# Patient Record
Sex: FEMALE | Race: WHITE | URBAN - METROPOLITAN AREA
[De-identification: names, ages, dates, MRNs, and addresses within clinical notes are randomized per-mention and may not be internally consistent; named-entity substitution may affect disease eponyms.]

---

## 2023-06-21 ENCOUNTER — TELEPHONE (OUTPATIENT)
Dept: NEUROLOGY | Facility: CLINIC | Age: 53
End: 2023-06-21

## 2023-06-27 ENCOUNTER — TELEPHONE (OUTPATIENT)
Dept: NEUROLOGY | Facility: CLINIC | Age: 53
End: 2023-06-27
Payer: COMMERCIAL

## 2023-06-27 NOTE — TELEPHONE ENCOUNTER
Spoke with pt who self-scheduled with Dr Juárez. Pt is seeking Neuro w/u as recommended by her Rheumatologist, Dr Live, @[facility], whom she sees for SLE, RA, Sjrogen's, for which she is treated, since 2014, with Benlysta infusions. 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 Pt had episode in February, 2023, of blacking out with no no known cause. Did not hit head. She once saw Neuro, Dr Chau Jules, at [facility] in 2015 (now retired) Insurance limits her to certain providers and [facility] [facility] is included. When pt is overtired, she occasionally has tremors. She experiences imbalance as well. She does have brain fog/ memory concerns, can no longer multi-task.  Offered next available appointment this Thursday in resident's clinic. Pt accepted next available, verbalized understanding, and repeated back date/time/location of scheduled appointment.

## 2023-06-29 ENCOUNTER — OFFICE VISIT (OUTPATIENT)
Dept: NEUROLOGY | Facility: CLINIC | Age: 53
End: 2023-06-29
Payer: COMMERCIAL

## 2023-06-29 VITALS — HEART RATE: 78 BPM | SYSTOLIC BLOOD PRESSURE: 124 MMHG | DIASTOLIC BLOOD PRESSURE: 82 MMHG

## 2023-06-29 DIAGNOSIS — G63 POLYNEUROPATHY ASSOCIATED WITH UNDERLYING DISEASE: Primary | ICD-10-CM

## 2023-06-29 PROCEDURE — 99204 PR OFFICE/OUTPT VISIT, NEW, LEVL IV, 45-59 MIN: ICD-10-PCS | Mod: S$GLB,,, | Performed by: PSYCHIATRY & NEUROLOGY

## 2023-06-29 PROCEDURE — 99999 PR PBB SHADOW E&M-EST. PATIENT-LVL IV: ICD-10-PCS | Mod: PBBFAC,,, | Performed by: STUDENT IN AN ORGANIZED HEALTH CARE EDUCATION/TRAINING PROGRAM

## 2023-06-29 PROCEDURE — 99999 PR PBB SHADOW E&M-EST. PATIENT-LVL IV: CPT | Mod: PBBFAC,,, | Performed by: STUDENT IN AN ORGANIZED HEALTH CARE EDUCATION/TRAINING PROGRAM

## 2023-06-29 PROCEDURE — 99204 OFFICE O/P NEW MOD 45 MIN: CPT | Mod: S$GLB,,, | Performed by: PSYCHIATRY & NEUROLOGY

## 2023-06-29 RX ORDER — HYDROXYCHLOROQUINE SULFATE 200 MG/1
TABLET, FILM COATED ORAL
COMMUNITY

## 2023-06-29 RX ORDER — METOPROLOL TARTRATE 25 MG/1
TABLET, FILM COATED ORAL
COMMUNITY

## 2023-06-29 RX ORDER — ERGOCALCIFEROL 1.25 MG/1
CAPSULE ORAL
COMMUNITY

## 2023-06-29 RX ORDER — METHOTREXATE 2.5 MG/1
TABLET ORAL
COMMUNITY

## 2023-06-29 RX ORDER — ONDANSETRON HYDROCHLORIDE 8 MG/1
TABLET, FILM COATED ORAL
COMMUNITY

## 2023-06-29 RX ORDER — ISOSORBIDE MONONITRATE 60 MG/1
TABLET, EXTENDED RELEASE ORAL
COMMUNITY
Start: 2023-01-30

## 2023-06-29 RX ORDER — ALBUTEROL SULFATE 0.83 MG/ML
SOLUTION RESPIRATORY (INHALATION)
COMMUNITY

## 2023-06-29 RX ORDER — SPIRONOLACTONE 50 MG/1
TABLET, FILM COATED ORAL
COMMUNITY
Start: 2023-01-30

## 2023-06-29 RX ORDER — BELIMUMAB 120 MG/1.5ML
INJECTION, POWDER, LYOPHILIZED, FOR SOLUTION INTRAVENOUS
COMMUNITY

## 2023-06-29 RX ORDER — VALACYCLOVIR HYDROCHLORIDE 500 MG/1
TABLET, FILM COATED ORAL
COMMUNITY
Start: 2022-09-22

## 2023-06-29 RX ORDER — LEUCOVORIN CALCIUM 10 MG/1
TABLET ORAL
COMMUNITY

## 2023-06-29 RX ORDER — DULOXETIN HYDROCHLORIDE 60 MG/1
CAPSULE, DELAYED RELEASE ORAL
COMMUNITY
Start: 2022-09-21

## 2023-06-29 RX ORDER — LIFITEGRAST 50 MG/ML
SOLUTION/ DROPS OPHTHALMIC
COMMUNITY

## 2023-06-29 RX ORDER — ATORVASTATIN CALCIUM 20 MG/1
TABLET, FILM COATED ORAL
COMMUNITY
Start: 2023-01-30

## 2023-06-29 RX ORDER — ESOMEPRAZOLE MAGNESIUM 40 MG/1
CAPSULE, DELAYED RELEASE ORAL
COMMUNITY

## 2023-06-29 RX ORDER — FOLIC ACID 1 MG/1
TABLET ORAL
COMMUNITY

## 2023-06-29 NOTE — PROGRESS NOTES
[facility] - NEUROLOGY 7TH [address] [facility], [place]    Date: 6/29/23  Patient Name: Shannan Smith   MRN: [id number]   PCP: Marlene Munoz  Referring Provider: Lorena Batista MD    Assessment:   Shannan Smith is a 52 y.o. female presenting as initial evaluation for intermittent tremors, sensory changes and intermittent word finding difficulty. Patient refers that its been years since and she has been requiring a walker for long distances because of fatigue and fall risk. On exam  strength is 5/5, DTR 2+ but some diminished vibration and light touch on LUE/LLE. No resting tremor noted, no cogwheel rigidity or bradykinesia noted. Given some subjective weakness and soreness will do evaluation for neuropathy or muscle issue, particularly given SLE history.     During interview patient gets very emotional about family situations and her work. She also has some RF for PJ and suspect memory issues multifactorial at the time. No interfere with patient's ADLs    Plan:   -- Labs for peripheral neuropathy  -- EMG/NCS   -- Will f/u in 6 months      Problem List Items Addressed This Visit    None  Visit Diagnoses       Polyneuropathy associated with underlying disease    -  Primary    Relevant Orders    Copper, Serum    Ambulatory referral/consult to Physical/Occupational Therapy    Ceruloplasmin    CK    EMG W/ ULTRASOUND AND NERVE CONDUCTION TEST 2 Extremities    Folate    Heavy Metals Screen, Blood (Quantitative)    T4, Free    Thyroid Peroxidase Antibody    TSH    Vitamin B1    Vitamin B12 Deficiency Panel    Vitamin B6    Vitamin C            Dalia Blanco MD    Patient note was created using MModal Dictation.  Any errors in syntax or even information may not have been identified and edited on initial review prior to signing this note.  Subjective:   Patient seen in consultation at the request of Lorena Batista MD for the evaluation of tremor. A copy of this note will be sent to  the referring physician.        HPI:   Ms. Shannan Smith is a 52 y.o. female  w pmH sle (belimumab) 2015, sjrogen, RA, neurocardiogenic syncope, angina pectoris peripheral neuropathy, shingles on valtrex HLD presenting as initial evaluation for multiple complaints such as memory problems, intermittent tremors and falls. Refers she is having short term memory issues, difficulty with multi tasking and organizing. 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 She refers some snoring and was evaluated for PJ but could complete study but they thought she was high risk. Having trouble explaining things and can not put into words. Alos refers some intermittent tremors, if reaches for the mouse has some hand jerks and having some trouble with writing.    She refers that she can no longer do long distances as she feels her muscles ware out which is why she uses walker. She also refers constant burning pain in her feet and has been on duloxetine for years.         PAST MEDICAL HISTORY:  No past medical history on file.    PAST SURGICAL HISTORY:  No past surgical history on file.    CURRENT MEDS:  Current Outpatient Medications   Medication Sig Dispense Refill    albuterol (PROVENTIL) 2.5 mg /3 mL (0.083 %) nebulizer solution albuterol sulfate (2.5 mg/3ml) 0.083% nebu      atorvastatin (LIPITOR) 20 MG tablet atorvastatin 20 mg tablet   ONE BY MOUTH EVERY DAY      belimumab (BENLYSTA) 120 mg SolR Benlysta 120 mg intravenous solution   INFUSE 10 MG/KG OVER 60 MINUTE(S) BY INTRAVENOUS ROUTE EVERY 4 WEEKS      DULoxetine (CYMBALTA) 60 MG capsule duloxetine 60 mg capsule,delayed release   TAKE ONE CAPSULE BY MOUTH EVERY DAY      ergocalciferol (ERGOCALCIFEROL) 50,000 unit Cap ergocalciferol (vitamin D2) 1,250 mcg (50,000 unit) capsule   TAKE ONE CAPSULE BY MOUTH ONCE WEEKLY      esomeprazole (NEXIUM) 40 MG capsule esomeprazole magnesium 40 mg capsule,delayed release   TAKE ONE CAPSULE BY MOUTH  EVERY DAY FOR REFLUX      folic acid (FOLVITE) 1 MG tablet folic acid 1 mg tablet   TAKE ONE TABLET BY MOUTH EVERY DAY      hydrOXYchloroQUINE (PLAQUENIL) 200 mg tablet hydroxychloroquine 200 mg tablet   TAKE ONE TABLET BY MOUTH TWICE DAILY      isosorbide mononitrate (IMDUR) 60 MG 24 hr tablet isosorbide mononitrate ER 60 mg tablet,extended release 24 hr   TAKE ONE TABLET BY MOUTH EVERY MORNING      leucovorin (WELLCOVORIN) 10 MG tablet leucovorin calcium 10 mg tablet   TAKE 1 TABLET BY MOUTH 24 HOURS AFTER  METHOTREXATE DOSE      lifitegrast (XIIDRA) 5 % Dpet Xiidra 5 % eye drops in a dropperette   INSTILL ONE DROP IN EACH EYE TWICE DAILY      methotrexate 2.5 MG Tab methotrexate sodium 2.5 mg tablet   TAKE FIVE TABLETS IN THE MORNING AND FOUR TABLETS IN THE EVENING BY MOUTH ONE DAY PER WEEK      metoprolol tartrate (LOPRESSOR) 25 MG tablet metoprolol tartrate 25 mg tablet   TAKE ONE TABLET BY MOUTH TWICE DAILY WITH FOOD FOR BLOOD PRESSURE      ondansetron (ZOFRAN) 8 MG tablet ondansetron HCl 8 mg tablet   Take 1 tablet every 24 hours by oral route as needed.      spironolactone (ALDACTONE) 50 MG tablet spironolactone 50 mg tablet   take 1 tablet by mouth twice daily      valACYclovir (VALTREX) 500 MG tablet valacyclovir 500 mg tablet   TAKE ONE TABLET BY MOUTH EVERY DAY       No current facility-administered medications for this visit.       ALLERGIES:  Review of patient's allergies indicates:   Allergen Reactions    Lisinopril Other (See Comments)     Cough    Ketorolac Other (See Comments)     Sweating  Burning        FAMILY HISTORY:  No family history on file.    SOCIAL HISTORY:       Review of Systems:  12 system review of systems is negative except for the symptoms mentioned in HPI.      Objective:     Vitals:    06/29/23 1537   BP: 124/82   Pulse: 78     General: NAD, well nourished   Eyes: no tearing, discharge, no erythema   ENT: moist mucous membranes of the oral cavity, nares patent    Neck: Supple,  full range of motion  Cardiovascular: Warm and well perfused, pulses equal and symmetrical  Lungs: Normal work of breathing, normal chest wall excursions  Skin: No rash, lesions, or breakdown on exposed skin  Psychiatry: Mood and affect are appropriate   Abdomen: soft, non tender, non distended  Extremeties: No cyanosis, clubbing or edema.    Neurological   MENTAL STATUS: Alert and oriented to person, place, and time. Attention and concentration within normal limits. Speech without dysarthria, able to name and repeat without difficulty. Recent and remote memory within normal limits   CRANIAL NERVES: Visual fields intact. PERRL. EOMI. Facial sensation intact. Face symmetrical. Hearing grossly intact. Full shoulder shrug bilaterally. Tongue protrudes midline   SENSORY: Sensation is intact to light touch, vibration, and temperature throughout, except for diminished light to LUE/LLE, diminished vibration at L toe. Joint position perception intact. Negative Romberg.   MOTOR: Normal bulk and tone. No pronator drift.  5/5 deltoid, biceps, triceps, interosseous, hand  bilaterally. 5/5 iliopsoas, knee extension/flexion, foot dorsi/plantarflexion bilaterally.    REFLEXES: Symmetric and 2+ throughout. Toes down going bilaterally.   CEREBELLAR/COORDINATION/GAIT: Gait steady with normal arm swing and stride length.  Heel to shin intact. Finger to nose intact. Normal rapid alternating movements.